# Patient Record
Sex: FEMALE | Race: WHITE | NOT HISPANIC OR LATINO
[De-identification: names, ages, dates, MRNs, and addresses within clinical notes are randomized per-mention and may not be internally consistent; named-entity substitution may affect disease eponyms.]

---

## 2021-07-21 ENCOUNTER — TRANSCRIPTION ENCOUNTER (OUTPATIENT)
Age: 31
End: 2021-07-21

## 2021-10-12 ENCOUNTER — TRANSCRIPTION ENCOUNTER (OUTPATIENT)
Age: 31
End: 2021-10-12

## 2022-01-11 ENCOUNTER — TRANSCRIPTION ENCOUNTER (OUTPATIENT)
Age: 32
End: 2022-01-11

## 2022-02-07 ENCOUNTER — TRANSCRIPTION ENCOUNTER (OUTPATIENT)
Age: 32
End: 2022-02-07

## 2022-03-10 PROBLEM — Z00.00 ENCOUNTER FOR PREVENTIVE HEALTH EXAMINATION: Status: ACTIVE | Noted: 2022-03-10

## 2022-04-06 ENCOUNTER — APPOINTMENT (OUTPATIENT)
Dept: GASTROENTEROLOGY | Facility: CLINIC | Age: 32
End: 2022-04-06

## 2022-06-30 ENCOUNTER — NON-APPOINTMENT (OUTPATIENT)
Age: 32
End: 2022-06-30

## 2024-07-25 ENCOUNTER — APPOINTMENT (OUTPATIENT)
Dept: ORTHOPEDIC SURGERY | Facility: CLINIC | Age: 34
End: 2024-07-25
Payer: COMMERCIAL

## 2024-07-25 VITALS — WEIGHT: 133 LBS | BODY MASS INDEX: 22.71 KG/M2 | HEIGHT: 64 IN

## 2024-07-25 DIAGNOSIS — J45.909 UNSPECIFIED ASTHMA, UNCOMPLICATED: ICD-10-CM

## 2024-07-25 DIAGNOSIS — S83.002A UNSPECIFIED SUBLUXATION OF LEFT PATELLA, INITIAL ENCOUNTER: ICD-10-CM

## 2024-07-25 DIAGNOSIS — M23.92 UNSPECIFIED INTERNAL DERANGEMENT OF LEFT KNEE: ICD-10-CM

## 2024-07-25 PROCEDURE — 73562 X-RAY EXAM OF KNEE 3: CPT | Mod: LT

## 2024-07-25 PROCEDURE — 99203 OFFICE O/P NEW LOW 30 MIN: CPT

## 2024-07-25 NOTE — ASSESSMENT
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  I did advise her this time I do recommend MRI of the left knee to evaluate for the patella subluxation injury.  She will be seen back after the MRI.  All questions were answered and she is agreeable with the plan.  Patient was advised if they develop any severe pain, numbness or tingling or pain with range of motion she must call or go to the emergency room immediately. All the signs and symptoms of compartment syndrome were explained.  The patient understands.  This medical record was created utilizing Dragon voice recognition software.  This software is not perfect and may occasionally create typographical errors which may be reflected in the above medical record.

## 2024-07-25 NOTE — IMAGING
[Left] : left knee [de-identified] : She is alert and oriented vital signs are stable.  Examination left knee reveals a trace effusion.  She does have some retropatellar tenderness around the medial retinaculum of the medial facet of the patella.  Does have medial joint line tenderness no lateral joint tenderness no varus valgus instability negative Lachman sign plus minus Chava's sign.  She is able to straight leg raise.  No calf tenderness.  She had a range of motion of 0 to 120 degrees.  She had full range of motion of the ankle She had normal neurologic exam Normal vascular exam. Normal skin exam. No evidence of open wounds infection or compartment syndrome. [FreeTextEntry9] : X-rays left knee 3 views revealed no fracture or dislocations.  There was slight patella tilt

## 2024-07-25 NOTE — HISTORY OF PRESENT ILLNESS
[] : yes [de-identified] : Patient is a 33-year-old female presents with a 1 week history of left knee pain.  Patient states that she was walking about 1 week ago when her patella dislocated.  Says that it went back in on its own after the second time it happened.  Says it happened about 2 years ago when a dog ran into her leg and it self relocated at that time.  Says that she did not have any treatment.  Wearing a brace that she feels the kneecap is unstable.

## 2024-08-12 ENCOUNTER — RESULT REVIEW (OUTPATIENT)
Age: 34
End: 2024-08-12

## 2024-08-21 ENCOUNTER — NON-APPOINTMENT (OUTPATIENT)
Age: 34
End: 2024-08-21

## 2024-08-22 ENCOUNTER — APPOINTMENT (OUTPATIENT)
Dept: ORTHOPEDIC SURGERY | Facility: CLINIC | Age: 34
End: 2024-08-22
Payer: COMMERCIAL

## 2024-08-22 VITALS — BODY MASS INDEX: 22.71 KG/M2 | HEIGHT: 64 IN | WEIGHT: 133 LBS

## 2024-08-22 DIAGNOSIS — S83.002D UNSPECIFIED SUBLUXATION OF LEFT PATELLA, SUBSEQUENT ENCOUNTER: ICD-10-CM

## 2024-08-22 DIAGNOSIS — M22.8X2 OTHER DISORDERS OF PATELLA, LEFT KNEE: ICD-10-CM

## 2024-08-22 DIAGNOSIS — M84.453D: ICD-10-CM

## 2024-08-22 PROCEDURE — 99213 OFFICE O/P EST LOW 20 MIN: CPT

## 2024-08-22 NOTE — HISTORY OF PRESENT ILLNESS
[] : yes [de-identified] : Patient is a 33-year-old female presents with a 1 week history of left knee pain.  Patient states that she was walking about 1 week ago when her patella dislocated.  Says that it went back in on its own after the second time it happened.  Says it happened about 2 years ago when a dog ran into her leg and it self relocated at that time.  Says that she did not have any treatment.  Wearing a brace that she feels the kneecap is unstable.   August 22, 2024.  Patient presents for follow-up for the MRI of her left knee. She does state that she has discomfort about 4 out of 10.  She does state that it is improved some but still having some pain.  She tries to limit her walking and only takes the dog around the corner for a walk as she does work from home.

## 2024-08-22 NOTE — DATA REVIEWED
[FreeTextEntry1] : MRI left knee shows bone marrow edema in the weightbearing surface lateral femoral condyle with associated minimal nondisplaced trabecular injury/microfracture.  No evidence for displaced fractures.  Soft tissue edema superior lateral aspect of both office fat pad.  These findings can be seen in setting of patellar maltracking.

## 2024-08-22 NOTE — IMAGING
[Left] : left knee [de-identified] : She is alert and oriented vital signs are stable.  Examination left knee reveals a trace effusion.  She does have some retropatellar tenderness around the medial retinaculum of the medial facet of the patella.  Does have medial joint line tenderness. no varus valgus instability negative Lachman sign plus minus Chava's sign.  She is able to straight leg raise.  No calf tenderness.  She had a range of motion of 0 to 120 degrees. She has tenderness around the lateral femoral condyle She had full range of motion of the ankle She had normal neurologic exam Normal vascular exam. Normal skin exam. No evidence of open wounds infection or compartment syndrome. [FreeTextEntry9] : X-rays left knee 3 views revealed no fracture or dislocations.  There was slight patella tilt

## 2024-08-22 NOTE — ASSESSMENT
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   MRIs reviewed with her she was given a copy report.  I did advise her at this time I want her to allow the knee to calm down as she has a microtrabecular fracture and she needs to stay off the knee is much as possible.  I did discuss with her about the brace and even using crutches that she needs to and to limit her walking.  She does work work from home.  I did advise 1 to get this 3 more weeks of rest and she will be seen back in 3 weeks and at that point if she is improved she will be started on physical therapy.  All questions were answered and she is agreeable with the plan.  Patient was advised if they develop any severe pain, numbness or tingling or pain with range of motion she must call or go to the emergency room immediately. All the signs and symptoms of compartment syndrome were explained.  The patient understands.  This medical record was created utilizing Dragon voice recognition software.  This software is not perfect and may occasionally create typographical errors which may be reflected in the above medical record.

## 2024-09-12 ENCOUNTER — APPOINTMENT (OUTPATIENT)
Dept: ORTHOPEDIC SURGERY | Facility: CLINIC | Age: 34
End: 2024-09-12
Payer: COMMERCIAL

## 2024-09-12 ENCOUNTER — NON-APPOINTMENT (OUTPATIENT)
Age: 34
End: 2024-09-12

## 2024-09-12 VITALS — WEIGHT: 133 LBS | BODY MASS INDEX: 22.71 KG/M2 | HEIGHT: 64 IN

## 2024-09-12 DIAGNOSIS — S83.002D UNSPECIFIED SUBLUXATION OF LEFT PATELLA, SUBSEQUENT ENCOUNTER: ICD-10-CM

## 2024-09-12 DIAGNOSIS — M84.453D: ICD-10-CM

## 2024-09-12 DIAGNOSIS — M22.8X2 OTHER DISORDERS OF PATELLA, LEFT KNEE: ICD-10-CM

## 2024-09-12 PROCEDURE — 99213 OFFICE O/P EST LOW 20 MIN: CPT

## 2024-09-12 NOTE — HISTORY OF PRESENT ILLNESS
[] : yes [de-identified] : Patient is a 33-year-old female presents with a 1 week history of left knee pain.  Patient states that she was walking about 1 week ago when her patella dislocated.  Says that it went back in on its own after the second time it happened.  Says it happened about 2 years ago when a dog ran into her leg and it self relocated at that time.  Says that she did not have any treatment.  Wearing a brace that she feels the kneecap is unstable.   August 22, 2024.  Patient presents for follow-up for the MRI of her left knee. She does state that she has discomfort about 4 out of 10.  She does state that it is improved some but still having some pain.  She tries to limit her walking and only takes the dog around the corner for a walk as she does work from home.  September 12 2024.  Patient presents for follow-up for her left knee.  She been using a knee sleeve and did not get as stabilizing brace.  She has not started physical therapy yet.  Says the knee is improving.

## 2024-09-12 NOTE — IMAGING
[Left] : left knee [de-identified] : She is alert and oriented vital signs are stable.  Examination left knee reveals a trace effusion.  She does have some retropatellar tenderness around the medial retinaculum of the medial facet of the patella.  Does have medial joint line tenderness. no varus valgus instability negative Lachman sign plus minus Chava's sign.  She is able to straight leg raise.  No calf tenderness.  She had a range of motion of 0 to 120 degrees. She has tenderness around the lateral femoral condyle She had full range of motion of the ankle She had normal neurologic exam Normal vascular exam. Normal skin exam. No evidence of open wounds infection or compartment syndrome. [FreeTextEntry9] : X-rays left knee 3 views revealed no fracture or dislocations.  There was slight patella tilt

## 2024-09-12 NOTE — ASSESSMENT
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   I did advise her she is only been using a knee sleeve that she should obtain a pillow patella stabilizing brace and she can get 1 online.  She was given the name.  I am also recommend this time that she remain out of sports and gym but she start a course of physical therapy.  She does states she has foot problems and she was advised seen with a foot specialists.  She will get the patella stabilizing brace, start physical therapy and be seen back in 3 to 4 weeks.  All questions were answered and she is agreeable with the plan.  Patient was advised if they develop any severe pain, numbness or tingling or pain with range of motion she must call or go to the emergency room immediately. All the signs and symptoms of compartment syndrome were explained.  The patient understands.  This medical record was created utilizing Dragon voice recognition software.  This software is not perfect and may occasionally create typographical errors which may be reflected in the above medical record.

## 2024-10-10 ENCOUNTER — APPOINTMENT (OUTPATIENT)
Dept: ORTHOPEDIC SURGERY | Facility: CLINIC | Age: 34
End: 2024-10-10
Payer: COMMERCIAL

## 2024-10-10 VITALS — BODY MASS INDEX: 22.71 KG/M2 | WEIGHT: 133 LBS | HEIGHT: 64 IN

## 2024-10-10 DIAGNOSIS — M22.8X2 OTHER DISORDERS OF PATELLA, LEFT KNEE: ICD-10-CM

## 2024-10-10 DIAGNOSIS — M84.453D: ICD-10-CM

## 2024-10-10 DIAGNOSIS — S83.002D UNSPECIFIED SUBLUXATION OF LEFT PATELLA, SUBSEQUENT ENCOUNTER: ICD-10-CM

## 2024-10-10 PROCEDURE — 99213 OFFICE O/P EST LOW 20 MIN: CPT
